# Patient Record
(demographics unavailable — no encounter records)

---

## 2025-06-13 NOTE — HISTORY OF PRESENT ILLNESS
[FreeTextEntry1] : FLORINA LYMAN is a 4 month old baby who presents today for head shape consultation Pt was seen by her pediatrician and referred for further evaluation and treatment.  Pt was born full term. Mom denies complications with pregnancy and delivery there is no significant past medical or surgical history Parent reports normal feeding and elimination patterns and normal infant development.  Age appropriate milestones and behavior. Infant hearing screen passed. Appropriate weight gain. Parents report flat head is getting worse as she gets older.